# Patient Record
Sex: FEMALE | Race: WHITE
[De-identification: names, ages, dates, MRNs, and addresses within clinical notes are randomized per-mention and may not be internally consistent; named-entity substitution may affect disease eponyms.]

---

## 2018-01-24 ENCOUNTER — HOSPITAL ENCOUNTER (EMERGENCY)
Dept: HOSPITAL 17 - NEPD | Age: 28
Discharge: HOME | End: 2018-01-24
Payer: COMMERCIAL

## 2018-01-24 VITALS
TEMPERATURE: 99.5 F | RESPIRATION RATE: 16 BRPM | HEART RATE: 79 BPM | SYSTOLIC BLOOD PRESSURE: 142 MMHG | OXYGEN SATURATION: 99 % | DIASTOLIC BLOOD PRESSURE: 85 MMHG

## 2018-01-24 VITALS — HEIGHT: 63 IN | BODY MASS INDEX: 32.23 KG/M2 | WEIGHT: 181.88 LBS

## 2018-01-24 VITALS — SYSTOLIC BLOOD PRESSURE: 134 MMHG | DIASTOLIC BLOOD PRESSURE: 89 MMHG

## 2018-01-24 VITALS
HEART RATE: 70 BPM | OXYGEN SATURATION: 99 % | SYSTOLIC BLOOD PRESSURE: 135 MMHG | RESPIRATION RATE: 17 BRPM | DIASTOLIC BLOOD PRESSURE: 97 MMHG

## 2018-01-24 DIAGNOSIS — Z34.91: ICD-10-CM

## 2018-01-24 DIAGNOSIS — R10.12: ICD-10-CM

## 2018-01-24 DIAGNOSIS — O26.891: Primary | ICD-10-CM

## 2018-01-24 LAB
BACTERIA #/AREA URNS HPF: (no result) /HPF
BASOPHILS # BLD AUTO: 0.1 TH/MM3 (ref 0–0.2)
BASOPHILS NFR BLD: 0.7 % (ref 0–2)
BUN SERPL-MCNC: 6 MG/DL (ref 7–18)
CALCIUM SERPL-MCNC: 9.1 MG/DL (ref 8.5–10.1)
CHLORIDE SERPL-SCNC: 106 MEQ/L (ref 98–107)
COLOR UR: YELLOW
CREAT SERPL-MCNC: 0.63 MG/DL (ref 0.5–1)
EOSINOPHIL # BLD: 0.2 TH/MM3 (ref 0–0.4)
EOSINOPHIL NFR BLD: 2.2 % (ref 0–4)
ERYTHROCYTE [DISTWIDTH] IN BLOOD BY AUTOMATED COUNT: 13.8 % (ref 11.6–17.2)
GFR SERPLBLD BASED ON 1.73 SQ M-ARVRAT: 114 ML/MIN (ref 89–?)
GLUCOSE SERPL-MCNC: 103 MG/DL (ref 74–106)
GLUCOSE UR STRIP-MCNC: (no result) MG/DL
HCO3 BLD-SCNC: 25.5 MEQ/L (ref 21–32)
HCT VFR BLD CALC: 37.8 % (ref 35–46)
HGB BLD-MCNC: 12.8 GM/DL (ref 11.6–15.3)
HGB UR QL STRIP: (no result)
KETONES UR STRIP-MCNC: (no result) MG/DL
LIPASE: 110 U/L (ref 73–393)
LYMPHOCYTES # BLD AUTO: 2.1 TH/MM3 (ref 1–4.8)
LYMPHOCYTES NFR BLD AUTO: 21.9 % (ref 9–44)
MCH RBC QN AUTO: 30.9 PG (ref 27–34)
MCHC RBC AUTO-ENTMCNC: 33.8 % (ref 32–36)
MCV RBC AUTO: 91.4 FL (ref 80–100)
MONOCYTE #: 0.6 TH/MM3 (ref 0–0.9)
MONOCYTES NFR BLD: 6.2 % (ref 0–8)
MUCOUS THREADS #/AREA URNS LPF: (no result) /LPF
NEUTROPHILS # BLD AUTO: 6.5 TH/MM3 (ref 1.8–7.7)
NEUTROPHILS NFR BLD AUTO: 69 % (ref 16–70)
NITRITE UR QL STRIP: (no result)
PLATELET # BLD: 387 TH/MM3 (ref 150–450)
PMV BLD AUTO: 8.5 FL (ref 7–11)
RBC # BLD AUTO: 4.14 MIL/MM3 (ref 4–5.3)
SODIUM SERPL-SCNC: 139 MEQ/L (ref 136–145)
SP GR UR STRIP: 1.02 (ref 1–1.03)
SQUAMOUS #/AREA URNS HPF: 4 /HPF (ref 0–5)
URINE LEUKOCYTE ESTERASE: (no result)
WBC # BLD AUTO: 9.4 TH/MM3 (ref 4–11)

## 2018-01-24 PROCEDURE — 85025 COMPLETE CBC W/AUTO DIFF WBC: CPT

## 2018-01-24 PROCEDURE — 83690 ASSAY OF LIPASE: CPT

## 2018-01-24 PROCEDURE — 80048 BASIC METABOLIC PNL TOTAL CA: CPT

## 2018-01-24 PROCEDURE — 84702 CHORIONIC GONADOTROPIN TEST: CPT

## 2018-01-24 PROCEDURE — 81001 URINALYSIS AUTO W/SCOPE: CPT

## 2018-01-24 PROCEDURE — 76700 US EXAM ABDOM COMPLETE: CPT

## 2018-01-24 PROCEDURE — 76817 TRANSVAGINAL US OBSTETRIC: CPT

## 2018-01-24 NOTE — PD
HPI


Chief Complaint:  Flank/Kidney Pain


Time Seen by Provider:  09:38


Travel History


International Travel<30 days:  No


Contact w/Intl Traveler<30days:  No


Traveled to known affect area:  No





History of Present Illness


HPI


This is a 27-year-old female who presents to the emergency department with left 

upper quadrant pain that's been going on for 2 days, intermittent, stabbing, 

nonradiating with no associated fevers, chills or vomiting.  She says her last 

menstrual cycle was one week ago.  She denies any diarrhea, dysuria, vaginal 

bleeding or vaginal discharge.  She says that she was on Depo-Provera one year 

ago and ever since then her menstrual cycles been irregular.  She takes OCPs 

but she did forget to take her birth control pills earlier this month.





PFSH


Past Medical History


Diminished Hearing:  No


Respiratory:  Yes (bronchitis)


Immunizations Current:  No


Influenza Vaccination:  No


Pregnant?:  Not Pregnant


Menopausal:  No


:  3


Para:  2


:  1





Past Surgical History


Surgical History:  No Previous Surgery





Social History


Alcohol Use:  Yes (occassional)


Tobacco Use:  No


Substance Use:  Yes (marijuana 3-4 weeks ago)





Allergies-Medications


(Allergen,Severity, Reaction):  


Coded Allergies:  


     No Known Allergies (Verified  Adverse Reaction, Unknown, 18)


Reported Meds & Prescriptions





Reported Meds & Active Scripts


Active


 (Prenatal Vit-Ferrous Fumarate) 27 Mg Iron-1 Mg Tab 1 Tab PO DAILY


Reported


[birth control]   1 Tab PO DAILY








Review of Systems


Except as stated in HPI:  all other systems reviewed are Neg





Physical Exam


Narrative


GENERAL:Well appearing, no acute distress


SKIN: Focused skin assessment warm and dry.


HEAD: Atraumatic. Normocephalic. 


EYES: Pupils equal and round.  No injection or drainage. 


ENT:  Moist mucous membranes


NECK: Trachea midline. 


CARDIOVASCULAR: Regular rate and rhythm.  No murmur appreciated.


RESPIRATORY: Clear to auscultation. Breath sounds equal bilaterally. 


GASTROINTESTINAL: Abdomen soft, non-tender, nondistended. 


MUSCULOSKELETAL: No obvious deformities. 


NEUROLOGICAL: Awake and alert. No obvious cranial nerve deficits.  Moving all 

extremities.


PSYCHIATRIC: Appropriate mood and affect; insight and judgment normal.





Data


Data


Last Documented VS





Vital Signs








  Date Time  Temp Pulse Resp B/P (MAP) Pulse Ox O2 Delivery O2 Flow Rate FiO2


 


18 12:40  70 17 135/97 (110) 99 Room Air  


 


18 08:34 99.5       








Orders





 Orders


Complete Blood Count With Diff (18 09:45)


Basic Metabolic Panel (Bmp) (18 09:45)


Beta Hcg (Quant/Titer) (18 09:45)


Lipase (18 09:45)


^ Insert Iv (18 09:45)


Urinalysis - C+S If Indicated (18 10:53)


Ed Poc Ultrasound (18 )


Us Pelvis (Ques Pr/Ect)W Trans (18 )





Labs





Laboratory Tests








Test


  18


09:55 18


10:45 18


11:10


 


White Blood Count 9.4 TH/MM3   


 


Red Blood Count 4.14 MIL/MM3   


 


Hemoglobin 12.8 GM/DL   


 


Hematocrit 37.8 %   


 


Mean Corpuscular Volume 91.4 FL   


 


Mean Corpuscular Hemoglobin 30.9 PG   


 


Mean Corpuscular Hemoglobin


Concent 33.8 % 


  


  


 


 


Red Cell Distribution Width 13.8 %   


 


Platelet Count 387 TH/MM3   


 


Mean Platelet Volume 8.5 FL   


 


Neutrophils (%) (Auto) 69.0 %   


 


Lymphocytes (%) (Auto) 21.9 %   


 


Monocytes (%) (Auto) 6.2 %   


 


Eosinophils (%) (Auto) 2.2 %   


 


Basophils (%) (Auto) 0.7 %   


 


Neutrophils # (Auto) 6.5 TH/MM3   


 


Lymphocytes # (Auto) 2.1 TH/MM3   


 


Monocytes # (Auto) 0.6 TH/MM3   


 


Eosinophils # (Auto) 0.2 TH/MM3   


 


Basophils # (Auto) 0.1 TH/MM3   


 


CBC Comment DIFF FINAL   


 


Differential Comment    


 


Blood Urea Nitrogen  6 MG/DL  


 


Creatinine  0.63 MG/DL  


 


Random Glucose  103 MG/DL  


 


Calcium Level  9.1 MG/DL  


 


Sodium Level  139 MEQ/L  


 


Potassium Level  4.0 MEQ/L  


 


Chloride Level  106 MEQ/L  


 


Carbon Dioxide Level  25.5 MEQ/L  


 


Anion Gap  8 MEQ/L  


 


Estimat Glomerular Filtration


Rate 


  114 ML/MIN 


  


 


 


Lipase  110 U/L  


 


Human Chorionic Gonadotropin,


Quant 


  3331 MIU/ML 


  


 


 


Urine Color   YELLOW 


 


Urine Turbidity   CLEAR 


 


Urine pH   6.0 


 


Urine Specific Gravity   1.022 


 


Urine Protein   TRACE mg/dL 


 


Urine Glucose (UA)   NEG mg/dL 


 


Urine Ketones   NEG mg/dL 


 


Urine Occult Blood   NEG 


 


Urine Nitrite   NEG 


 


Urine Bilirubin   NEG 


 


Urine Urobilinogen


  


  


  LESS THAN 2.0


MG/DL


 


Urine Leukocyte Esterase   NEG 


 


Urine RBC   1 /hpf 


 


Urine WBC   1 /hpf 


 


Urine Squamous Epithelial


Cells 


  


  4 /hpf 


 


 


Urine Bacteria   RARE /hpf 


 


Urine Mucus   FEW /lpf 


 


Microscopic Urinalysis Comment


  


  


  CULT NOT


INDICATED











MDM


Medical Decision Making


Medical Screen Exam Complete:  Yes


Emergency Medical Condition:  Yes


Interpretation(s)


Blood work is reassuring


HCG is 3331


Pelvic ultrasound demonstrates early gestational sac


Differential Diagnosis


Pregnancy, pancreatitis, nephrolithiasis, urinary tract infection, 

pyelonephritis


Narrative Course


This is a 27 year old female who presents to the emergency department with left-

sided abdominal pain.  She appears very well and has a benign abdomen.  HCG was 

found to be 3331.  Pelvic ultrasound was obtained which demonstrates a 

gestational sac and I can appreciate a yolk sac on images.  I don't suspect a 

surgical etiology of her symptoms given her benign exam.  Patient will be 

discharged home to follow-up with obstetrician as an outpatient.





Diagnosis





 Primary Impression:  


 Intrauterine pregnancy


Patient Instructions:  General Instructions





***Additional Instructions:  


If you develop severe or worsening abdominal pain, fever>100.4, persistent 

vomiting or inability to eat or drink return to the emergency department 

immediately. 








Follow up with [facility] at:


Follow up with:





[facility]


[address] [place] [address]. [address]


[place] [address]


Phone: [phone]


Office Hours


Monday - Thursday 9:00 am - 5:30 pm


Friday 8:00 am - 12:00 pm


Teen Tuesdays 4:00 - 6:30 pm


***Med/Other Pt SpecificInfo:  Prescription(s) given


Scripts


Prenatal Vit-Ferrous Fumarate () 27 Mg Iron-1 Mg Tab


1 TAB PO DAILY for Nutritional Supplement, #30 TAB 0 Refills


   Prov: Olimpia Lynch MD         18


Disposition:   DISCHARGE HOME


Condition:  Stable











Olimpia Lynch MD 2018 14:07

## 2018-01-24 NOTE — RADRPT
EXAM DATE/TIME:  01/24/2018 13:47 

 

[facility] COMPARISON:     

No previous studies available for comparison.

        

 

INDICATIONS :     Left pelvic pain.

                     

LAB(S):     

Beta-hCG:     3,331

                     

MEDICAL HISTORY :     Pregnancy.     

SURGICAL HISTORY :     None.     

ENCOUNTER:     Initial

ACUITY:     1 day

PAIN SCORE:     8/10

LOCATION:     Left pelvis 

                     

MEASUREMENTS:     

UTERUS:                                  8.2 x 5.8 x 4.7 cm

ENDOMETRIAL STRIPE:      13 mm

RIGHT OVARY:                      2.8 x 2.3  x 1.1 cm

LEFT OVARY:                         3.5x 2.7 x 1.5 cm

FINDINGS:     

UTERUS:     There is small apparent gestational sac within the uterus.  I don't see cardiac activity 
is yet.

RIGHT OVARY:     Ovary contains no mass or significant cystic lesion.  

LEFT OVARY:     1.5 there is cystic mass left adnexa region.

MISCELLANEOUS:     No free fluid.  

 

CONCLUSION:     Probably early IUP with gestational sac.  Follow up suggested.  There is no free flui
d.  

 Malcom Perez MD FACR on January 24, 2018 at 15:41           

Board Certified Radiologist.

 This report was verified electronically.

## 2018-05-01 ENCOUNTER — HOSPITAL ENCOUNTER (OUTPATIENT)
Dept: HOSPITAL 17 - HPND | Age: 28
End: 2018-05-01
Attending: FAMILY MEDICINE
Payer: COMMERCIAL

## 2018-05-01 DIAGNOSIS — Z36.3: ICD-10-CM

## 2018-05-01 DIAGNOSIS — O26.842: Primary | ICD-10-CM

## 2018-05-01 PROCEDURE — 76805 OB US >/= 14 WKS SNGL FETUS: CPT

## 2018-06-12 ENCOUNTER — HOSPITAL ENCOUNTER (OUTPATIENT)
Dept: HOSPITAL 17 - HPND | Age: 28
End: 2018-06-12
Attending: FAMILY MEDICINE
Payer: COMMERCIAL

## 2018-06-12 DIAGNOSIS — O44.22: Primary | ICD-10-CM

## 2018-06-12 PROCEDURE — 76816 OB US FOLLOW-UP PER FETUS: CPT
